# Patient Record
Sex: FEMALE | Race: OTHER | HISPANIC OR LATINO | URBAN - METROPOLITAN AREA
[De-identification: names, ages, dates, MRNs, and addresses within clinical notes are randomized per-mention and may not be internally consistent; named-entity substitution may affect disease eponyms.]

---

## 2024-05-01 ENCOUNTER — EMERGENCY (EMERGENCY)
Facility: HOSPITAL | Age: 24
LOS: 1 days | Discharge: HOME | End: 2024-05-01
Admitting: STUDENT IN AN ORGANIZED HEALTH CARE EDUCATION/TRAINING PROGRAM
Payer: MEDICAID

## 2024-05-01 VITALS
OXYGEN SATURATION: 100 % | RESPIRATION RATE: 18 BRPM | DIASTOLIC BLOOD PRESSURE: 84 MMHG | SYSTOLIC BLOOD PRESSURE: 130 MMHG | TEMPERATURE: 99 F | HEART RATE: 89 BPM

## 2024-05-01 PROCEDURE — 99284 EMERGENCY DEPT VISIT MOD MDM: CPT

## 2024-05-01 PROCEDURE — 73110 X-RAY EXAM OF WRIST: CPT | Mod: 26,LT

## 2024-05-01 RX ORDER — IBUPROFEN 200 MG
600 TABLET ORAL ONCE
Refills: 0 | Status: COMPLETED | OUTPATIENT
Start: 2024-05-01 | End: 2024-05-01

## 2024-05-01 RX ORDER — IBUPROFEN 200 MG
1 TABLET ORAL
Qty: 28 | Refills: 0
Start: 2024-05-01 | End: 2024-05-07

## 2024-05-01 RX ADMIN — Medication 600 MILLIGRAM(S): at 17:36

## 2024-05-01 RX ADMIN — Medication 600 MILLIGRAM(S): at 18:10

## 2024-05-01 NOTE — ED PROVIDER NOTE - UPPER EXTREMITY EXAM, LEFT
left wrist: good ROM, tender to radial aspect, no erythema, no ecchymosis, no edema, no deformity, reproducible pain with thumb flexion, +finkelstein test, able to make a fist, moving all fingers, sensation intact, negative tinel's test.

## 2024-05-01 NOTE — ED PROVIDER NOTE - NSFOLLOWUPINSTRUCTIONS_ED_ALL_ED_FT
Rest, drink plenty of fluids.  Advance activity as tolerated.  Continue all previously prescribed medications as directed.  Follow up with your primary care physician in 48-72 hours- bring copies of your results.  Return to the ER for worsening or persistent symptoms, and/or ANY NEW OR CONCERNING SYMPTOMS. If you have issues obtaining follow up, please call: [phone] to obtain a doctor or specialist who takes your insurance in your area.     Take Motrin/Ibuprofen 600 mg every 6-8 hours as needed for moderate pain -- take with food.   Apply warm compress to affected area for 15 minutes, 3-4 times per day.  Use wrist brace/splint as directed.    Our Discharge Lounge will contact you for appointment with Hand specialist.

## 2024-05-01 NOTE — ED PROVIDER NOTE - PATIENT PORTAL LINK FT
You can access the FollowMyHealth Patient Portal offered by [facility] by registering at the following website: [email or web address]. By joining [facility]’s FollowMyHealth portal, you will also be able to view your health information using other applications (apps) compatible with our system.

## 2024-05-01 NOTE — ED ADULT NURSE NOTE - OBJECTIVE STATEMENT
Patient is a 22 yo female, denies phx, presenting with L wrist pain with movement x 2 months. Denies injury or trauma. A&Ox4, no signs of distress, no pain or swelling. Denies fever. Medicated for pain per orders. Sent to x-ray. Fall precautions maintained.

## 2024-05-01 NOTE — ED PROVIDER NOTE - CLINICAL SUMMARY MEDICAL DECISION MAKING FREE TEXT BOX
22 yo F with no PMH, accompanied by father, presents to ED c/o left wrist pain x2 months, atraumatic. well appearing female. there is no fever. Impression: De Quervain's tenosynovitis. Plan: Xray L wrist, pain control w/ NSAIDS, wrist brace, hand referral.

## 2024-05-01 NOTE — ED PROVIDER NOTE - PROGRESS NOTE DETAILS
PA JAQUI: Patient reassessed, sitting comfortably in chair in NAD, denies any complaints. States feeling better, symptoms improved. XR no acute finding. Pt is medically stable for discharge and follow up with PMD and Hand. The patient was given verbal and written discharge instructions. Specifically, instructions when to return to the ED and when to seek follow-up from their pcp was discussed. Any specialty follow-up was discussed, including how to make an appointment.  Instructions were discussed in simple, plain language and was understood by the patient. The patient understands that should their symptoms worsen or any new symptoms arise, they should return to the ED immediately for further evaluation. All pt's questions were answered. Patient verbalizes understanding.

## 2024-05-01 NOTE — ED PROVIDER NOTE - OBJECTIVE STATEMENT
24 yo F with no PMH, accompanied by father, presents to ED c/o left wrist pain x2 months, atraumatic. Reports she thought she sprained it but pain continued for 2 months, took Advil once w/o improvement. States pain is worse with movements and moving right thumb. Admits right handed, works as . Denies any fever, chills, swelling of wrist, weakness, numbness, or any other complaints.